# Patient Record
(demographics unavailable — no encounter records)

---

## 2025-02-28 NOTE — ASSESSMENT
[FreeTextEntry1] : Patient has developed new onset of sciatic pain, which is a change from their typical lower back pain. This may indicate a change in their underlying condition, potentially related to their hernia procedure positioning, subsequent favoring of one side, or worsening/progression of lack pain  The patient has failed rest, activity modification, chiropractic therapy, NSAIDs, muscle relaxants.   Plan - Lumbar MRI - will submit authorization     - Follow-Up: The follow-up will be scheduled after MRI results are obtained.

## 2025-02-28 NOTE — PHYSICAL EXAM
[Normal] : Regular, no tachycardia [Normal muscle bulk without asymmetry] : normal muscle bulk without asymmetry [Spinous Process Tenderness] : spinous process tenderness [Facet Tenderness] : facet tenderness [SLR] : positive straight leg raise [Whitten's Test] : positive Whitten's Test [LE] : Sensory: Intact in bilateral lower extremities [Patellar] : patellar 2+ and symmetric bilaterally [de-identified] : scoliotic posture [de-identified] : limited extension of lumbar spine with pain  [de-identified] : walks with forward flexed stature

## 2025-02-28 NOTE — HISTORY OF PRESENT ILLNESS
[FreeTextEntry1] : 62 y/o male with history of scoliosis as well as back pain since 1996. Patient had a Left Lumbar Medical Branch Block at L4-L5 and L5-S1 done on 12/28/23 which provided significant relief   His progress was initially satisfactory until January when he had an unrelated left sided hernia procedure. He reported favoring left side for about three to four months prior to the surgery. Over the last two weeks, he started experiencing severe restrictions to his mobility, increased lower back pain, and new symptoms of radicular pain down his anterior and posterior left leg including numbness and weakness. His symptoms are interfering with his daily activities, such as walking and sitting for prolonged periods. His symptoms are relieved by using a TENs unit, but they return. His pain, on a scale of 1-10, is currently at a 4. Patient's medical regimen may have context and other medical interventions on his lower back pain. Due to the severe impact of the pain and the effect on his activities of daily living, the patient will be referred to another physician for further management.

## 2025-03-13 NOTE — REVIEW OF SYSTEMS
[Negative] : Constitutional [FreeTextEntry9] : negative aside HPI  [de-identified] : negative aside HPI

## 2025-03-13 NOTE — PHYSICAL EXAM
[Normal] : Well developed, in no acute distress, alert and oriented to person, place and time [Spinous Process Tenderness] : spinous process tenderness [Facet Tenderness] : facet tenderness [Paraspinal Tenderness] : paraspinal tenderness [Antalgic] : antalgic [] : Motor: [LE] : 5/5 motor strength in bilateral lower extremities [Patellar] : patellar 2+ and symmetric bilaterally [Achilles] : Achilles 2+ and symmetric bilaterally [de-identified] : Thoracic spine/ paraspinal tenderness to palpation.  [de-identified] : significantly reduced left hip external and internal rotation.

## 2025-03-13 NOTE — PHYSICAL EXAM
[Normal] : Well developed, in no acute distress, alert and oriented to person, place and time [Spinous Process Tenderness] : spinous process tenderness [Facet Tenderness] : facet tenderness [Paraspinal Tenderness] : paraspinal tenderness [Antalgic] : antalgic [] : Motor: [LE] : 5/5 motor strength in bilateral lower extremities [Patellar] : patellar 2+ and symmetric bilaterally [Achilles] : Achilles 2+ and symmetric bilaterally [de-identified] : Thoracic spine/ paraspinal tenderness to palpation.  [de-identified] : significantly reduced left hip external and internal rotation.

## 2025-03-13 NOTE — ASSESSMENT
[FreeTextEntry1] : 60 y/o male with history of scoliosis as well as back pain since 1996. Today we discussed his thoracic back pain. We recommended trialing a left sided medial branch block at T10-11, T11-T12. We also ordered a left hip MRI for him prior to his visit with the orthopedic hip specialist. He agreed with these recommendations.   Plan Left Hip MRI Thoracic Medial Branch Blocks T10-T11, T11-T12   The potential benefits as well as rare but possible risks were reviewed with the patient.  These risks including infection including hematoma, nerve injury, paralysis, failure to relieve pain or worse pain, headache, pneumothorax, elevated blood sugars, allergic reactions, adverse reactions to medications, vasovagal reactions, falls, etc. Following that discussion, we decided together that the potential benefits outweigh the potential risks and we decided to proceed with scheduling the procedure.  I answered all the patient's questions about the procedure including the technical details of the procedure and also offered that if any other questions arise, we will also be happy to answer those on the day of the procedure. All questions today were answered to the patient's satisfaction, and the patient stated their verbal understanding.

## 2025-03-13 NOTE — HISTORY OF PRESENT ILLNESS
[FreeTextEntry1] : 60 y/o male with history of scoliosis as well as back pain since 1996. Patient had a Left Lumbar Medical Branch Block at L4-L5 and L5-S1 done on 12/28/23 which provided significant relief. Today, he presents for a follow up visit for his back pain. He recently saw Dr. Riddle who has referred to a hip orthopedic specialist for his left sided hip pain. He continues with middle left sided back pain which induces muscle spasms in the same area. He states it does not radiate anywhere. Rest improves his pain, and physical activity has worsened his pain. Patient denies bowel or bladder dysfunction, significant weakness, or saddle anesthesia.

## 2025-03-13 NOTE — REVIEW OF SYSTEMS
[Negative] : Constitutional [FreeTextEntry9] : negative aside HPI  [de-identified] : negative aside HPI

## 2025-03-13 NOTE — REVIEW OF SYSTEMS
[Negative] : Constitutional [FreeTextEntry9] : negative aside HPI  [de-identified] : negative aside HPI

## 2025-03-13 NOTE — DATA REVIEWED
[FreeTextEntry1] : LUMBAR MRI  INDIVIDUAL LEVELS:  LOWER THORACIC SPINE: Right-sided foraminal narrowing along the lower portion of the thoracic spine secondary to the scoliosis. No central canal narrowing.  L1-L2: Moderate facet hypertrophic changes. No central canal or foraminal narrowing. Deviation of the nerve roots to the right secondary to scoliosis. L2-L3: Moderate facet arthrosis of mild to moderate ligamentous hypertrophy. Mild right lateral recess narrowing. Mild to moderate foraminal narrowing. Deviation of the nerve roots to the right secondary to the scoliosis. No central canal or foraminal narrowing. L3-L4: Mild to moderate facet arthrosis. Posterior osseous ridging. Moderate right and mild to moderate left foraminal narrowing. Mild right lateral recess narrowing. Deviation of the nerve roots to the right secondary to the scoliosis. L4-L5: Moderate facet arthrosis. Mild posterior osseous ridging. Small right paracentral annular tear. Mild right and moderate to severe left foraminal narrowing. Mild bilateral lateral recess narrowing. No central canal narrowing. L5-S1: Moderate left facet hypertrophic changes. Mild posterior osseous ridging. Moderate to severe left foraminal narrowing with contact of exiting left L5 nerve root. No central canal narrowing. Mild right foraminal narrowing.

## 2025-03-13 NOTE — PHYSICAL EXAM
[Normal] : Well developed, in no acute distress, alert and oriented to person, place and time [Spinous Process Tenderness] : spinous process tenderness [Facet Tenderness] : facet tenderness [Paraspinal Tenderness] : paraspinal tenderness [Antalgic] : antalgic [] : Motor: [LE] : 5/5 motor strength in bilateral lower extremities [Patellar] : patellar 2+ and symmetric bilaterally [Achilles] : Achilles 2+ and symmetric bilaterally [de-identified] : Thoracic spine/ paraspinal tenderness to palpation.  [de-identified] : significantly reduced left hip external and internal rotation.

## 2025-03-21 NOTE — CONSULT LETTER
[FreeTextEntry2] : Dr. Riddle [FreeTextEntry1] : I had the privilege of evaluating your patient today. I have enclosed my office note for your reference. If you have any questions, concerns or further input, please do not hesitate to contact me.  Thank you for allowing me to participate in the care of your patient.  Sincerely, Joao Colmenares MD

## 2025-03-21 NOTE — HISTORY OF PRESENT ILLNESS
[de-identified] : 63M who presents with left hip osteoarthritis. Patient reports that over the past 6 weeks he has been dealing with hip pain. States that the pain is located in his groin. He states that the pain is severe at times and is especially bad after standing up from prolonged seating position such as he does in his work as a . He states that he also has significant trouble getting his shoes and socks on and difficulty going up and down stairs. He reports that he has a history of low back pain as well secondary to scoliosis for which he has received several lumbar injections. He reports that he has not undergone any treatment thus far for his hip pain. He has not tried pt, injections, has occasionally taken tylenol and ibuprofen which have provided some relief but he is reluctant to take them.

## 2025-03-21 NOTE — PHYSICAL EXAM
[de-identified] : Gait: He walks with an antalgic gait   Inspection: Hip appears unremarkable No lymphedema observed. No pitting edema observed. No ulcerations observed   Palpation: No tenderness to palpation   Range of Motion:         Right: HF: 100 IR: 20 ER: 40                  Left: HF: IR: 5 ER: 20   Leg Length Discrepancy: no appreciable LLD   Both hips are stable no sign of dislocation or subluxation on exam   - [facility] - Straight leg raise   Knee exam is normal bilaterally. No effusions. Good pain free full ROM bilaterally, both knees are stable to varus/valgus and ant/post stress [de-identified] : 4 views of the hips are reviewed.  There is severe arthritis of the left hip with joint space narrowing and significant osteophyte formation of both femoral neck and acetabular side.  There is less advanced arthritis in the right side but still joint space narrowing sclerosis and osteophyte formation is noted.

## 2025-03-21 NOTE — DISCUSSION/SUMMARY
[de-identified] : I was present with the Fellow during the key portions of the history and exam. I discussed the case with the Fellow and agree with the findings and plan as documented in the [facility] note, unless otherwise noted below.    Bilateral hip arthritis much more significant and currently exacerbated on the left side.  I had a long discussion with the patient regarding hip arthritis / degenerative disease and treatment options. We reviewed the nature and etiology of degenerative hip disease.  We discussed the spectrum of symptomatic treatments. Our discussion included the use of appropriate exercises, activity modifications, weight reduction and maintenance, appropriate medication use, use of assistive devices, role of injections and avoidance of high impact activities.  Patient has significant scoliosis as well and we had a long discussion about the difficulty differentiating symptoms between the hip and spine.  He is shane continue with treatment for the spine and has an upcoming appointment for injections with pain management.  I provided prescription for physical therapy to be directed at the hip.  We will follow-up in 3 months to gauge his response.  In the interim if is a significant question concerning we will hesitate to contact us.

## 2025-03-29 NOTE — PROCEDURE
[FreeTextEntry1] : Thoracic MBB at left T10/11 T11/12 (2 levels, 3 nerves) The potential benefits as well as rare but possible risks were reviewed with the patient.  These risks including infection including epidural abscess, meningitis, osteomyelitis, and discitis, bleeding including epidural hematoma, nerve injury, paralysis, failure to relieve pain or worse pain, headache, pneumothorax, elevated blood sugars, allergic reactions, adverse reactions to medications, vasovagal reactions, falls, etc. Following that discussion, all questions were again answered to the patients satisfaction, the patient stated their verbal understanding and written consent was obtained.    After obtaining consent, pre-procedure blood pressure and pulse were recorded and are in the nursing record for review. The patient was placed in a prone position. The respective lumbosacral area was prepped with chloroprep and draped in sterile fashion.   A 25 gauge 3.5 inch, 25G 5 inch needle was inserted into the target medial branch nerve under fluoroscopic guidance. No paresthesias were elicited with needle placement and aspiration was negative for blood and CSF. Next 2 ml of a Solution of 5 ml Bupivacaine 0.25% and 10 mg Dexamethasone was injected.   The identical procedure was performed at the remaining levels. The skin was cleansed, and a sterile bandage was applied. Following the procedure, the patient's vital signs were stable. The patient tolerated the procedure well and no complications were encountered. The patient was discharged home in good condition with post-procedural instructions.   Time Out: Immediately prior to the procedure, the following was verbally confirmed that there is a consent form and that the correct patient, planned procedure, site and side are consistent with documentation and that necessary equipment and/or blood products are available prior to the start of the case.   Complications: none EBL: <5 cc No dexamethasone was wasted

## 2025-05-09 NOTE — PROCEDURE
[FreeTextEntry1] : Transforaminal lumbar epidural steroid injection  left, L3-4, L4-5,      After obtaining consent, pre-procedure blood pressure and heart rate were stable and recorded in the nursing record. Standard monitors were applied. The patient was placed in the prone position on the fluoroscopy table. The lumbosacral area was prepped with chloroprep. A 25 gauge 3.5 inch spinal needle was advanced to the safe triangle in the upper pole of the each foramen. No paresthesias were elicited with needle placement and aspiration was negative for blood and CSF.   Correct needle position was confirmed with approximately 1 ml contrast dye Omnipaque injected under real-time fluoroscopy. No evidence of vascular or intrathecal uptake was seen and there was both epidural and peripheral spread of the contrast agent. 5 mg dexamethasone plus 1.5 ml  0.5% lidocaine,  was slowly injected. The needle was removed. The same procedure was performed at the remaining nerve roots. The skin was cleansed and a sterile bandage was applied. The patient tolerated the procedure well and no complications were encountered. Following the procedure the patient's vital signs were stable. The patient was discharged home in good condition with post-procedural instructions.   Time Out: Immediately prior to the procedure, the following was verbally confirmed that there is a consent form and that the correct patient, planned procedure, site and side are consistent with documentation and that necessary equipment and/or blood products are available prior to the start of the case.   Total Dexamethasone 10 mg, Total Lidocaine 0.5% 3 ml  NO DEXAMETHASONE WAS WASTED   Complications: none EBL: <5 cc

## 2025-06-10 NOTE — HISTORY OF PRESENT ILLNESS
[FreeTextEntry1] : 62 y/o male with history of scoliosis as well as back pain since 1996. Patient had a Left Lumbar Medical Branch Block at L4-L5 and L5-S1 done on 12/28/23 which provided significant relief. Today, he presents for a follow up visit after having a Transforaminal lumbar epidural steroid injection left, L3-4, L4-5 on 5/8/25   The patient, a , reports a 65% improvement in pain following the injection. The main concern now is posture-related issues and discomfort, particularly when sitting for long periods and transitioning from sitting to standing, and prolonged walking. Any activity causes increased pain. The patient describes feeling 'hunched over' when walking and standing up from a seated position.  He has a sense of discomfort and tightness. The patient denies difficulty with activities such as washing dishes, cooking, or brushing teeth. However, they report an inability to walk for long distances due to discomfort. The patient has noticed a decline in their posture over time.  He has an appointment with a hip orthopedic specialist for his left sided hip pain on 7/7/25.

## 2025-06-10 NOTE — ASSESSMENT
[FreeTextEntry1] : 62 y/o male with history of scoliosis as well as back pain since 1996. Patient had a Left Lumbar Medical Branch Block at L4-L5 and L5-S1 done on 12/28/23 which provided significant relief. Today, he presents for a follow up visit after having a Transforaminal lumbar epidural steroid injection left, L3-4, L4-5 on 5/8/25  The patient, a , reports a 65% improvement in pain following the injection. The main concern now is posture-related issues and discomfort, particularly when sitting for long periods and transitioning from sitting to standing, and prolonged walking. Any activity causes increased pain. The patient describes feeling 'hunched over' when walking and standing up from a seated position. He has a sense of discomfort and tightness. The patient denies difficulty with activities such as washing dishes, cooking, or brushing teeth. However, they report an inability to walk for long distances due to discomfort. The patient has noticed a decline in their posture over time due to pain with extension.  The patient has failed rest, activity modification, 6 weeks of physician directed physical therapy, NSAIDs, muscle relaxants, and neuropathic medications.  They have also failed acupuncture, chiropractic care, and surgical evaluation. At this point an interventional therapy targeted at alleviating their pain and improving their functionality is a reasonable treatment option.  Plan: - Recommend LMBB L4-L5 and L5-S1 Left sided, local anesthetic. Will plan for RFA of the area if patient has 2 successful blocks. The potential benefits as well as rare but possible risks were reviewed with the patient.  These risks including infection including epidural abscess, meningitis, osteomyelitis, and discitis, bleeding including epidural hematoma, nerve injury, paralysis, failure to relieve pain or worse pain, headache, pneumothorax, elevated blood sugars, allergic reactions, adverse reactions to medications, vasovagal reactions, falls, etc. Following that discussion, we decided together that the potential benefits outweigh the potential risks and we decided to proceed with scheduling the procedure.  I answered all the patient's questions about the procedure including the technical details of the procedure and also offered that if any other questions arise, we will also be happy to answer those on the day of the procedure. All questions today were answered to the patient's satisfaction, and the patient stated their verbal understanding. - Follow up with ortho joint specialist for left hip pain on 7/7/25 - Follow up for injection

## 2025-06-10 NOTE — PHYSICAL EXAM
[Normal] : Regular, no tachycardia [Normal muscle bulk without asymmetry] : normal muscle bulk without asymmetry [Spinous Process Tenderness] : spinous process tenderness [Facet Tenderness] : facet tenderness [Whitten's Test] : positive Whitten's Test [LE] : Sensory: Intact in bilateral lower extremities [Patellar] : patellar 2+ and symmetric bilaterally [de-identified] : scoliotic posture [de-identified] : limited extension of lumbar spine with pain  [de-identified] : walks with forward flexed stature

## 2025-06-10 NOTE — PHYSICAL EXAM
[Normal] : Regular, no tachycardia [Normal muscle bulk without asymmetry] : normal muscle bulk without asymmetry [Spinous Process Tenderness] : spinous process tenderness [Facet Tenderness] : facet tenderness [Whitten's Test] : positive Whitten's Test [LE] : Sensory: Intact in bilateral lower extremities [Patellar] : patellar 2+ and symmetric bilaterally [de-identified] : limited extension of lumbar spine with pain  [de-identified] : scoliotic posture [de-identified] : walks with forward flexed stature

## 2025-06-10 NOTE — DATA REVIEWED
[FreeTextEntry1] : MR SPINE LUMBAR  - ORDERED BY: GIUSEPPE RICK   PROCEDURE DATE:  03/11/2025    INTERPRETATION:  CLINICAL INFORMATION: Lower back pain  ADDITIONAL CLINICAL INFORMATION: Low back muscle strain S39.012A  TECHNIQUE: Multiplanar, multisequence MRI was performed of the lumbar spine. IV Contrast: NONE  PRIOR STUDIES: Lumbar spine MRI dated 4/6/2023  FINDINGS:  LOCALIZER: Degenerative changes of the hips, left greater than right. BONES: There is a rounded hemangioma within the L1 vertebra measuring 1.4 cm, unchanged. Mild Modic type II changes at the superior plate of L1. ALIGNMENT: Severe levocurvature with apex at L1-2. There is an approximate Patton angle of 62 degrees. There is mild right lateral listhesis of T12 on L1. Minimal left lateral listhesis of L2 on L3. Mild retrolisthesis of T12 on L1. Varying levels of disc space narrowing noted. SACROILIAC JOINTS/SACRUM: There is no sacral fracture. The SI joints are partially visualized but are intact. CONUS AND CAUDA EQUINA: The distal cord and conus are normal in signal. Conus terminates at T11-T12. VISUALIZED INTRAPELVIC/INTRA-ABDOMINAL SOFT TISSUES: Small right renal cyst. PARASPINAL SOFT TISSUES: Normal.   INDIVIDUAL LEVELS:  LOWER THORACIC SPINE: Right-sided foraminal narrowing along the lower portion of the thoracic spine secondary to the scoliosis. No central canal narrowing.  L1-L2: Moderate facet hypertrophic changes. No central canal or foraminal narrowing. Deviation of the nerve roots to the right secondary to scoliosis. L2-L3: Moderate facet arthrosis of mild to moderate ligamentous hypertrophy. Mild right lateral recess narrowing. Mild to moderate foraminal narrowing. Deviation of the nerve roots to the right secondary to the scoliosis. No central canal or foraminal narrowing. L3-L4: Mild to moderate facet arthrosis. Posterior osseous ridging. Moderate right and mild to moderate left foraminal narrowing. Mild right lateral recess narrowing. Deviation of the nerve roots to the right secondary to the scoliosis. L4-L5: Moderate facet arthrosis. Mild posterior osseous ridging. Small right paracentral annular tear. Mild right and moderate to severe left foraminal narrowing. Mild bilateral lateral recess narrowing. No central canal narrowing. L5-S1: Moderate left facet hypertrophic changes. Mild posterior osseous ridging. Moderate to severe left foraminal narrowing with contact of exiting left L5 nerve root. No central canal narrowing. Mild right foraminal narrowing.   IMPRESSION:  Multilevel spondylosis in the setting of severe levocurvature similar in appearance to prior exam.

## 2025-07-07 NOTE — DISCUSSION/SUMMARY
[de-identified] :  I was present with the Fellow during the key portions of the history and exam. I discussed the case with the Fellow and agree with the findings and plan as documented in the [facility] note, unless otherwise noted below.  Left greater than right hip arthritis.  I had a long discussion with the patient regarding hip arthritis / degenerative disease and treatment options. We reviewed the nature and etiology of degenerative hip disease.  We discussed the spectrum of symptomatic treatments. Our discussion included the use of appropriate exercises, activity modifications, weight reduction and maintenance, appropriate medication use, use of assistive devices, role of injections and avoidance of high impact activities.  Given the clinical symptoms, physical exam and imaging findings, we discussed the possibility of hip replacement surgery.  We reviewed the elective quality of life nature of the procedure.   We do long discussion regarding the significant spine pathology that he also has.  Likely both the spine and hip are contributing to his symptoms and.  We cannot predict with absolute accuracy which symptoms are coming from which area although the groin pain is most likely coming from the hip and we discussed that.  If he progresses to the point of needing surgical intervention of both we would need to consult with spine regarding timing and which would be addressed first.  If the spine provide it can make significant realignment it could impact cup position which could impact stability of the hip if the hip had been done first and we discussed that.  However currently he wants to continue managing nonoperatively which is certainly reasonable.  He is following up with spine next week.  I like to see him back in 6 to 12 months for the hip or anytime he has any significant concern or he thinks he needs to proceed forward with surgical intervention.  Patient is in agreement this plan.  All questions answered.

## 2025-07-07 NOTE — PHYSICAL EXAM
[de-identified] : Gait: He walks with an antalgic gait  Inspection: Hip appears unremarkable No lymphedema observed. No pitting edema observed. No ulcerations observed  Palpation: No tenderness to palpation  Range of Motion: Right: HF: 100 IR: 20 ER: 40   Left: HF: IR: 5 ER: 20  Leg Length Discrepancy: no appreciable LLD  Both hips are stable no sign of dislocation or subluxation on exam  - [facility] - Straight leg raise  Knee exam is normal bilaterally. No effusions. Good pain free full ROM bilaterally, both knees are stable to varus/valgus and ant/post stress     [de-identified] : Imagin views of the hips are reviewed. There is severe arthritis of the left hip with joint space narrowing and significant osteophyte formation of both femoral neck and acetabular side. There is less advanced arthritis in the right side but still joint space narrowing sclerosis and osteophyte formation is noted.

## 2025-07-07 NOTE — HISTORY OF PRESENT ILLNESS
[de-identified] : 63M who presents for fu of left hip osteoarthritis. States that the pain is located in his groin. He states that the pain is severe at times and is especially bad after standing up from prolonged seating position such as he does in his work as a . He states that he also has significant trouble getting his shoes and socks on and difficulty going up and down stairs. He reports that he has a history of low back pain as well secondary to scoliosis for which he has received several lumbar injections. since last visit he has done home exercises for his hip and gotten MYESHA for his back. Not much change in his baseline disability and pain. States his bigger issue is his back and wishes to focus on that first.

## 2025-07-22 NOTE — ASSESSMENT
[FreeTextEntry1] : 64 y/o male with history of scoliosis as well as back pain since 1996. Patient had a Left Lumbar Medical Branch Block at L4-L5 and L5-S1 done on 12/28/23 which provided significant relief. He then had a Transforaminal lumbar epidural steroid injection left, L3-4, L4-5 on 5/8/25. Today, he presents for left medial branch block at L4-5, L5-S1 left.   Lumbar MBB, left L4, L5, and SA medial branches (L4/5 and L5/S1) (2 levels, 3 nerves).  Potential benefits as well as rare but possible risks were reviewed with the patient.  These risks including infection including epidural abscess, meningitis, osteomyelitis, and discitis, bleeding including epidural hematoma, nerve injury, paralysis, failure to relieve pain or worse pain, headache, pneumothorax, elevated blood sugars, allergic reactions, adverse reactions to medications, vasovagal reactions, falls, etc. Following that discussion, all questions were again answered to the patient's satisfaction, the patient stated their verbal understanding and written consent was obtained.     After obtaining consent, pre-procedure blood pressure and pulse were recorded and are in the nursing record for review. The patient was placed in a prone position. The respective lumbosacral area was prepped with chloroprep and draped in sterile fashion.     A 25 gauge 3.5 inch, 25G 5 inch needle was inserted into the target medial branch nerve under fluoroscopic guidance. No paresthesias were elicited with needle placement and aspiration was negative for blood and CSF. Next 2 ml of a Solution of 7 ml Bupivacaine 0.25% and 20 mg Dexamethasone was injected.     The identical procedure was performed at the remaining levels. The skin was cleansed, and a sterile bandage was applied. Following the procedure, the patient's vital signs were stable. The patient tolerated the procedure well and no complications were encountered. The patient was discharged home in good condition with post-procedural instructions.    NO DEXAMETHASONE WAS WASTED     Time Out: Immediately prior to the procedure, the following was verbally confirmed that there is a consent form and that the correct patient, planned procedure, site and side are consistent with documentation and that necessary equipment and/or blood products are available prior to the start of the case.     Complications: none  EBL: <5 cc

## 2025-07-22 NOTE — HISTORY OF PRESENT ILLNESS
[FreeTextEntry1] : 62 y/o male with history of scoliosis as well as back pain since 1996. Patient had a Left Lumbar Medical Branch Block at L4-L5 and L5-S1 done on 12/28/23 which provided significant relief. He then had a Transforaminal lumbar epidural steroid injection left, L3-4, L4-5 on 5/8/25. Today, he presents for left medial branch block at L4-5, L5-S1 left.